# Patient Record
Sex: MALE | Race: OTHER | HISPANIC OR LATINO
[De-identification: names, ages, dates, MRNs, and addresses within clinical notes are randomized per-mention and may not be internally consistent; named-entity substitution may affect disease eponyms.]

---

## 2021-09-09 PROBLEM — Z00.00 ENCOUNTER FOR PREVENTIVE HEALTH EXAMINATION: Status: ACTIVE | Noted: 2021-09-09

## 2021-09-10 ENCOUNTER — APPOINTMENT (OUTPATIENT)
Dept: DISASTER EMERGENCY | Facility: CLINIC | Age: 46
End: 2021-09-10

## 2021-09-11 LAB — SARS-COV-2 N GENE NPH QL NAA+PROBE: NOT DETECTED

## 2021-09-13 ENCOUNTER — OUTPATIENT (OUTPATIENT)
Dept: OUTPATIENT SERVICES | Facility: HOSPITAL | Age: 46
LOS: 1 days | End: 2021-09-13
Payer: COMMERCIAL

## 2021-09-13 ENCOUNTER — RESULT REVIEW (OUTPATIENT)
Age: 46
End: 2021-09-13

## 2021-09-13 DIAGNOSIS — K92.2 GASTROINTESTINAL HEMORRHAGE, UNSPECIFIED: ICD-10-CM

## 2021-09-13 PROCEDURE — 43239 EGD BIOPSY SINGLE/MULTIPLE: CPT

## 2021-09-13 PROCEDURE — 88312 SPECIAL STAINS GROUP 1: CPT | Mod: 26

## 2021-09-13 PROCEDURE — 88305 TISSUE EXAM BY PATHOLOGIST: CPT

## 2021-09-13 PROCEDURE — 88305 TISSUE EXAM BY PATHOLOGIST: CPT | Mod: 26

## 2021-09-13 PROCEDURE — 88312 SPECIAL STAINS GROUP 1: CPT

## 2021-09-15 LAB — SURGICAL PATHOLOGY STUDY: SIGNIFICANT CHANGE UP

## 2021-09-16 DIAGNOSIS — Z01.818 ENCOUNTER FOR OTHER PREPROCEDURAL EXAMINATION: ICD-10-CM

## 2021-09-17 ENCOUNTER — APPOINTMENT (OUTPATIENT)
Dept: DISASTER EMERGENCY | Facility: CLINIC | Age: 46
End: 2021-09-17

## 2021-09-19 LAB — SARS-COV-2 N GENE NPH QL NAA+PROBE: NOT DETECTED

## 2021-09-20 ENCOUNTER — RESULT REVIEW (OUTPATIENT)
Age: 46
End: 2021-09-20

## 2021-09-20 ENCOUNTER — OUTPATIENT (OUTPATIENT)
Dept: OUTPATIENT SERVICES | Facility: HOSPITAL | Age: 46
LOS: 1 days | End: 2021-09-20
Payer: COMMERCIAL

## 2021-09-20 DIAGNOSIS — K62.5 HEMORRHAGE OF ANUS AND RECTUM: ICD-10-CM

## 2021-09-20 PROCEDURE — 88305 TISSUE EXAM BY PATHOLOGIST: CPT

## 2021-09-20 PROCEDURE — 45380 COLONOSCOPY AND BIOPSY: CPT

## 2021-09-20 PROCEDURE — 88305 TISSUE EXAM BY PATHOLOGIST: CPT | Mod: 26

## 2021-09-20 NOTE — PROGRESS NOTE ADULT - SUBJECTIVE AND OBJECTIVE BOX
Colonoscopy Report  Indication: Hematochezia   Referring: Jorge Luis MD  Instrument:  # [id number]  Anesthesia: MAC  Consent:  Informed consent was obtained from the patient after providing any opportunity for questions  Procedure: After placing the patient in the left lateral decubitus position, the colonoscope was gently inserted into the rectum and advanced to the cecum. Color, texture, mucosa, and anatomy of the colon were carefully examined with the scope. The patient tolerated the procedure well. After completion of the exam, the patient was transferred to the recovery room.     Preparation:  Findings:   Anal Canal	        Large internal hemorrhoids  Rectum	                Punctate mucosal erythema (Bxed)  Sigmoid Colon 	        Few diverticula  Descending Colon	Normal  Splenic Flexure	Normal  Transverse Colon	Normal  Hepatic Flexure	Normal  Ascending Colon	 Normal  Cecum	                 Normal  Ileo-cecal Valve	 Normal  Ileum 	                 Not intubated     EBL:0    Impression:     1. Rare diverticulosis  2. R/o proctitis  3. Internal hemorrhoids    Plan:  1. Follow up path  2. High fiber diet  3. Sitz bath      Procedure Start Time:  10:13  Cecum Reached Time:  10:18  Procedure End Time:   10:26  Total Withdrawal Time: 8 minutes      Attending:     Jorge Luis MD

## 2021-09-22 LAB — SURGICAL PATHOLOGY STUDY: SIGNIFICANT CHANGE UP

## 2023-04-27 ENCOUNTER — APPOINTMENT (OUTPATIENT)
Dept: SURGERY | Facility: CLINIC | Age: 48
End: 2023-04-27
Payer: COMMERCIAL

## 2023-04-27 VITALS — HEIGHT: 68 IN | SYSTOLIC BLOOD PRESSURE: 138 MMHG | DIASTOLIC BLOOD PRESSURE: 86 MMHG | HEART RATE: 82 BPM

## 2023-04-27 VITALS — BODY MASS INDEX: 27.83 KG/M2 | WEIGHT: 183 LBS

## 2023-04-27 DIAGNOSIS — Z82.5 FAMILY HISTORY OF ASTHMA AND OTHER CHRONIC LOWER RESPIRATORY DISEASES: ICD-10-CM

## 2023-04-27 DIAGNOSIS — Z82.0 FAMILY HISTORY OF EPILEPSY AND OTHER DISEASES OF THE NERVOUS SYSTEM: ICD-10-CM

## 2023-04-27 DIAGNOSIS — Z87.891 PERSONAL HISTORY OF NICOTINE DEPENDENCE: ICD-10-CM

## 2023-04-27 PROCEDURE — 99203 OFFICE O/P NEW LOW 30 MIN: CPT

## 2023-04-27 NOTE — CONSULT LETTER
[Dear  ___] : Dear  [unfilled], [Consult Letter:] : I had the pleasure of evaluating your patient, [unfilled]. [Please see my note below.] : Please see my note below. [Consult Closing:] : Thank you very much for allowing me to participate in the care of this patient.  If you have any questions, please do not hesitate to contact me. [Sincerely,] : Sincerely, [FreeTextEntry3] : Elías Fuentes MD, FACS

## 2023-04-27 NOTE — PHYSICAL EXAM
[Alert] : alert [Oriented to Person] : oriented to person [Oriented to Place] : oriented to place [Oriented to Time] : oriented to time [Calm] : calm [de-identified] : He  is alert, well-groomed, and in NAD\par   [de-identified] : anicteric.  Nasal mucosa pink, septum midline. Oral mucosa pink.  Tongue midline, Pharynx without exudates.\par   [de-identified] : Neck supple. Trachea midline. Thyroid isthmus barely palpable, lobes not felt.\par   [de-identified] :  incarcerated umbilical hernia, mildly tender.  The defect appears to be relatively small, less then 3 cm and the skin overlying the hernia is normal . \par reducible Left   inguinal hernia.  No evidence of hernia on the opposite side.  No penile discharge or lesions.  No scrotal swelling or discoloration. Testes descended bilaterally, smooth, without masses. Epididymis non-tender.

## 2023-04-27 NOTE — HISTORY OF PRESENT ILLNESS
[de-identified] : Mr. JULIO CESAR MORTENSEN is a 47 year  old male who was referred by Dr. Tom Shah with the chief complaint of having an umbilical and  left inguinal  hernia.  He reports having this condition for many years. He denies any trauma to the area, fever, nausea, vomiting, distension, night sweats and  loss of appetite.  Symptoms aggravated by cough and straining.    He reports normal bowel movements .  He reports   blood per rectum.  He denies  previous abdominal surgeries or  wound infections. \par \par

## 2023-04-27 NOTE — PLAN
[FreeTextEntry1] : Mr. MORTENSEN  was told significance of findings, options, risks and benefits were explained.  Informed consent for incarcerated umbilical and left inguinal hernia repair with mesh and potential risks, benefits and alternatives (surgical options were discussed including non-surgical options or the option of no surgery) to the planned surgery were discussed in depth.  All surgical options were discussed including non-surgical treatments.  \par patient reports melana and acid reflux. he was advised to have GI work up prior to surgery. he will follow up after EGD and colonoscopy to discuss his surgery in details. \par Patient advised to seek immediate medical attention with any acute change in symptoms or with the development of any new or worsening symptoms.  Patient's questions and concerns addressed to patient's satisfaction, and patient verbalized an understanding of the information discussed.\par \par

## 2023-06-19 ENCOUNTER — APPOINTMENT (OUTPATIENT)
Dept: SURGERY | Facility: CLINIC | Age: 48
End: 2023-06-19
Payer: COMMERCIAL

## 2023-06-19 VITALS
HEIGHT: 68 IN | DIASTOLIC BLOOD PRESSURE: 81 MMHG | BODY MASS INDEX: 27.74 KG/M2 | WEIGHT: 183 LBS | SYSTOLIC BLOOD PRESSURE: 126 MMHG | HEART RATE: 106 BPM

## 2023-06-19 PROCEDURE — 99213 OFFICE O/P EST LOW 20 MIN: CPT

## 2023-06-19 NOTE — PHYSICAL EXAM
[Alert] : alert [Oriented to Person] : oriented to person [Oriented to Place] : oriented to place [Oriented to Time] : oriented to time [Calm] : calm [de-identified] : He  is alert, well-groomed, and in NAD\par   [de-identified] : anicteric.  Nasal mucosa pink, septum midline. Oral mucosa pink.  Tongue midline, Pharynx without exudates.\par   [de-identified] : Neck supple. Trachea midline. Thyroid isthmus barely palpable, lobes not felt.\par   [de-identified] :  incarcerated umbilical hernia, mildly tender.  The defect appears to be relatively small, less then 3 cm and the skin overlying the hernia is normal . \par reducible Left   inguinal hernia.  No evidence of hernia on the opposite side.  No penile discharge or lesions.  No scrotal swelling or discoloration. Testes descended bilaterally, smooth, without masses. Epididymis non-tender.

## 2023-06-19 NOTE — HISTORY OF PRESENT ILLNESS
[de-identified] : Mr. JULIO CESAR MORTENSEN is a 47 year  old male who was referred by Dr. Tom Shah with the chief complaint of having an umbilical and  left inguinal  hernia.  He reports having this condition for many years. He denies any trauma to the area, fever, nausea, vomiting, distension, night sweats and  loss of appetite.  Symptoms aggravated by cough and straining.    He reports normal bowel movements . He had black stools and he had an EGD and it was  normal. he wants to have the colonoscopy after the hernia repair  \par \par

## 2023-06-19 NOTE — PLAN
[FreeTextEntry1] : Mr. MORTENSEN  was told significance of findings, options, risks and benefits were explained.  Informed consent for  left inguinal hernia  repair with mesh and  incarcerated umbilical  hernia repair and potential risks, benefits and alternatives (surgical options were discussed including non-surgical options or the option of no surgery) to the planned surgery were discussed in depth.  All surgical options were discussed including non-surgical treatments.  He wishes to proceed with surgery.  We will plan for surgery on at the next available date, pending any required insurance pre-certification or pre-approval. He agrees to obtain any necessary pre-operative evaluations and testing prior to surgery.\par Patient advised to seek immediate medical attention with any acute change in symptoms or with the development of any new or worsening symptoms.  Patient's questions and concerns addressed to patient's satisfaction, and patient verbalized an understanding of the information discussed.\par \par

## 2023-07-14 ENCOUNTER — OUTPATIENT (OUTPATIENT)
Dept: OUTPATIENT SERVICES | Facility: HOSPITAL | Age: 48
LOS: 1 days | End: 2023-07-14
Payer: COMMERCIAL

## 2023-07-14 VITALS
DIASTOLIC BLOOD PRESSURE: 76 MMHG | SYSTOLIC BLOOD PRESSURE: 117 MMHG | WEIGHT: 179.9 LBS | RESPIRATION RATE: 16 BRPM | OXYGEN SATURATION: 98 % | HEART RATE: 86 BPM | HEIGHT: 68 IN | TEMPERATURE: 99 F

## 2023-07-14 DIAGNOSIS — Z01.818 ENCOUNTER FOR OTHER PREPROCEDURAL EXAMINATION: ICD-10-CM

## 2023-07-14 DIAGNOSIS — Z98.890 OTHER SPECIFIED POSTPROCEDURAL STATES: Chronic | ICD-10-CM

## 2023-07-14 DIAGNOSIS — K40.90 UNILATERAL INGUINAL HERNIA, WITHOUT OBSTRUCTION OR GANGRENE, NOT SPECIFIED AS RECURRENT: ICD-10-CM

## 2023-07-14 DIAGNOSIS — K21.9 GASTRO-ESOPHAGEAL REFLUX DISEASE WITHOUT ESOPHAGITIS: ICD-10-CM

## 2023-07-14 DIAGNOSIS — K42.0 UMBILICAL HERNIA WITH OBSTRUCTION, WITHOUT GANGRENE: ICD-10-CM

## 2023-07-14 LAB
ANION GAP SERPL CALC-SCNC: 6 MMOL/L — SIGNIFICANT CHANGE UP (ref 5–17)
BLD GP AB SCN SERPL QL: SIGNIFICANT CHANGE UP
BUN SERPL-MCNC: 16 MG/DL — SIGNIFICANT CHANGE UP (ref 7–18)
CALCIUM SERPL-MCNC: 8.5 MG/DL — SIGNIFICANT CHANGE UP (ref 8.4–10.5)
CHLORIDE SERPL-SCNC: 105 MMOL/L — SIGNIFICANT CHANGE UP (ref 96–108)
CO2 SERPL-SCNC: 28 MMOL/L — SIGNIFICANT CHANGE UP (ref 22–31)
CREAT SERPL-MCNC: 0.9 MG/DL — SIGNIFICANT CHANGE UP (ref 0.5–1.3)
EGFR: 105 ML/MIN/1.73M2 — SIGNIFICANT CHANGE UP
GLUCOSE SERPL-MCNC: 94 MG/DL — SIGNIFICANT CHANGE UP (ref 70–99)
HCT VFR BLD CALC: 34.3 % — LOW (ref 39–50)
HGB BLD-MCNC: 9.6 G/DL — LOW (ref 13–17)
MCHC RBC-ENTMCNC: 20.9 PG — LOW (ref 27–34)
MCHC RBC-ENTMCNC: 28 GM/DL — LOW (ref 32–36)
MCV RBC AUTO: 74.7 FL — LOW (ref 80–100)
NRBC # BLD: 0 /100 WBCS — SIGNIFICANT CHANGE UP (ref 0–0)
PLATELET # BLD AUTO: 304 K/UL — SIGNIFICANT CHANGE UP (ref 150–400)
POTASSIUM SERPL-MCNC: 4.2 MMOL/L — SIGNIFICANT CHANGE UP (ref 3.5–5.3)
POTASSIUM SERPL-SCNC: 4.2 MMOL/L — SIGNIFICANT CHANGE UP (ref 3.5–5.3)
RBC # BLD: 4.59 M/UL — SIGNIFICANT CHANGE UP (ref 4.2–5.8)
RBC # FLD: 17.2 % — HIGH (ref 10.3–14.5)
SODIUM SERPL-SCNC: 139 MMOL/L — SIGNIFICANT CHANGE UP (ref 135–145)
WBC # BLD: 6.75 K/UL — SIGNIFICANT CHANGE UP (ref 3.8–10.5)
WBC # FLD AUTO: 6.75 K/UL — SIGNIFICANT CHANGE UP (ref 3.8–10.5)

## 2023-07-14 PROCEDURE — 93005 ELECTROCARDIOGRAM TRACING: CPT

## 2023-07-14 PROCEDURE — G0463: CPT

## 2023-07-14 RX ORDER — SODIUM CHLORIDE 9 MG/ML
3 INJECTION INTRAMUSCULAR; INTRAVENOUS; SUBCUTANEOUS EVERY 8 HOURS
Refills: 0 | Status: DISCONTINUED | OUTPATIENT
Start: 2023-07-21 | End: 2023-08-04

## 2023-07-14 NOTE — H&P PST ADULT - NEGATIVE PSYCHIATRIC SYMPTOMS
Dick    
no suicidal ideation/no depression/no anxiety/no insomnia/no memory loss

## 2023-07-14 NOTE — H&P PST ADULT - PROBLEM SELECTOR PLAN 1
Patient is scheduled for left inguinal hernia repair with mesh and incarcerated umbilical hernia repair on 7/21/2023.Written and oral preoperative instructions given to patient with understanding verbalized. Instructions given  to include using 4% chlorhexidine wash and directed starting 3 days before day of surgery ( inclusive day of surgery). Maintaining NPO status post midnight before surgery. Stopping aspirin, NSAIDs, herbs, vitamins 7 days before surgery. Patient is to expect a phone call day before surgery between the hours 4 : 30 pm and 6: 30 pm giving arrival time for surgery day.  Patient today with STOP bang score of 2 , low risk for ALCON , labs draw, EKG done awaiting for results

## 2023-07-14 NOTE — H&P PST ADULT - PROBLEM SELECTOR PLAN 2
Patient is having low risk surgery, does not have his own PCP , no complains, in case of abnormal lab tests needs to have medical clearance to be arranged at [facility] or labs need to be reviewed by Anesthesia and depends on scenario to be evaluated if patient needs medical clearance

## 2023-07-14 NOTE — H&P PST ADULT - ASSESSMENT
Patient is scheduled for left inguinal hernia repair with mesh and incarcerated umbilical hernia repair on 7/21/2023.

## 2023-07-14 NOTE — H&P PST ADULT - NSANTHOSAYNRD_GEN_A_CORE
No. ALCON screening performed.  STOP BANG Legend: 0-2 = LOW Risk; 3-4 = INTERMEDIATE Risk; 5-8 = HIGH Risk

## 2023-07-14 NOTE — H&P PST ADULT - GASTROINTESTINAL COMMENTS
acid reflux , hemorrhoids umbilical area slight tenderness and distension due to umbilical hernia not being  able to be retracted

## 2023-07-14 NOTE — H&P PST ADULT - PROBLEM SELECTOR PLAN 3
Take Nexium in the morning of surgery with few sips of water , follow up after surgery with Surgeon and is advised ti find Primary doctor to have yearly checkups done regularly.

## 2023-07-14 NOTE — H&P PST ADULT - CONTRAINDICATED REASON
occasional Nicotine patch  Informed pt of the various negative side effects of smoking including risk of COPD, Lung Ca etc  Strongly recommended that pt stops smoking and pt given various options of smoking cessation tools such as NRT's and other pharmacotherapies no desire to stop

## 2023-07-14 NOTE — H&P PST ADULT - LAST STRESS TEST
Spoke with pt and   Pt is feeling fine today    Most concerned about the fact that "bile" came up yesterday  Discussed zofran use when just nauseated - may stop the vomiting  Pt to carry around hydration and sip all day  Pt does not want iv hydration  Will cb if vomiting recurs never

## 2023-07-14 NOTE — H&P PST ADULT - HISTORY OF PRESENT ILLNESS
48 years old male pmh of acid reflux and hemorrhoids c/o of swelling and tenderness in area of umbilicus x many years , pt was also found having swelling in left groin. Patient was diagnosed with umbilical hernia with obstruction and without gangrene and unilateral inguinal hernia without obstruction and without gangrene , non recurrent and is scheduled for left inguinal hernia repair with mesh and incarcerated umbilical hernia repair on 7/21/2023.

## 2023-07-14 NOTE — H&P PST ADULT - GENITOURINARY MALE
left inguinal hernia, no palpable , hx of hemorrhoids , no bleeding from hemorrhoids at this moment/hernia L/hypospadias

## 2023-07-20 ENCOUNTER — TRANSCRIPTION ENCOUNTER (OUTPATIENT)
Age: 48
End: 2023-07-20

## 2023-07-21 ENCOUNTER — APPOINTMENT (OUTPATIENT)
Dept: SURGERY | Facility: HOSPITAL | Age: 48
End: 2023-07-21
Payer: COMMERCIAL

## 2023-07-21 ENCOUNTER — TRANSCRIPTION ENCOUNTER (OUTPATIENT)
Age: 48
End: 2023-07-21

## 2023-07-21 ENCOUNTER — OUTPATIENT (OUTPATIENT)
Dept: OUTPATIENT SERVICES | Facility: HOSPITAL | Age: 48
LOS: 1 days | End: 2023-07-21
Payer: COMMERCIAL

## 2023-07-21 VITALS
TEMPERATURE: 98 F | WEIGHT: 179.9 LBS | HEART RATE: 82 BPM | OXYGEN SATURATION: 97 % | RESPIRATION RATE: 16 BRPM | DIASTOLIC BLOOD PRESSURE: 73 MMHG | SYSTOLIC BLOOD PRESSURE: 109 MMHG | HEIGHT: 68 IN

## 2023-07-21 VITALS
TEMPERATURE: 98 F | HEART RATE: 92 BPM | RESPIRATION RATE: 15 BRPM | DIASTOLIC BLOOD PRESSURE: 80 MMHG | OXYGEN SATURATION: 96 % | SYSTOLIC BLOOD PRESSURE: 125 MMHG

## 2023-07-21 DIAGNOSIS — K40.90 UNILATERAL INGUINAL HERNIA, WITHOUT OBSTRUCTION OR GANGRENE, NOT SPECIFIED AS RECURRENT: ICD-10-CM

## 2023-07-21 DIAGNOSIS — Z98.890 OTHER SPECIFIED POSTPROCEDURAL STATES: Chronic | ICD-10-CM

## 2023-07-21 DIAGNOSIS — K42.0 UMBILICAL HERNIA WITH OBSTRUCTION, WITHOUT GANGRENE: ICD-10-CM

## 2023-07-21 LAB — BLD GP AB SCN SERPL QL: SIGNIFICANT CHANGE UP

## 2023-07-21 PROCEDURE — 49505 PRP I/HERN INIT REDUC >5 YR: CPT | Mod: LT

## 2023-07-21 PROCEDURE — 49505 PRP I/HERN INIT REDUC >5 YR: CPT

## 2023-07-21 PROCEDURE — 86900 BLOOD TYPING SEROLOGIC ABO: CPT

## 2023-07-21 PROCEDURE — C1781: CPT

## 2023-07-21 PROCEDURE — 36415 COLL VENOUS BLD VENIPUNCTURE: CPT

## 2023-07-21 PROCEDURE — 86901 BLOOD TYPING SEROLOGIC RH(D): CPT

## 2023-07-21 PROCEDURE — 49592 RPR AA HRN 1ST < 3 NCR/STRN: CPT | Mod: AS

## 2023-07-21 PROCEDURE — 86850 RBC ANTIBODY SCREEN: CPT

## 2023-07-21 PROCEDURE — 49505 PRP I/HERN INIT REDUC >5 YR: CPT | Mod: AS,LT

## 2023-07-21 PROCEDURE — 49592 RPR AA HRN 1ST < 3 NCR/STRN: CPT

## 2023-07-21 DEVICE — MESH HERNIA MARLEX 3 X 6": Type: IMPLANTABLE DEVICE | Status: FUNCTIONAL

## 2023-07-21 RX ORDER — OXYCODONE AND ACETAMINOPHEN 5; 325 MG/1; MG/1
1 TABLET ORAL
Qty: 8 | Refills: 0
Start: 2023-07-21 | End: 2023-07-22

## 2023-07-21 RX ORDER — SODIUM CHLORIDE 9 MG/ML
1000 INJECTION, SOLUTION INTRAVENOUS
Refills: 0 | Status: DISCONTINUED | OUTPATIENT
Start: 2023-07-21 | End: 2023-07-21

## 2023-07-21 RX ORDER — ESOMEPRAZOLE MAGNESIUM 40 MG/1
1 CAPSULE, DELAYED RELEASE ORAL
Refills: 0 | DISCHARGE

## 2023-07-21 RX ORDER — HYDROMORPHONE HYDROCHLORIDE 2 MG/ML
0.5 INJECTION INTRAMUSCULAR; INTRAVENOUS; SUBCUTANEOUS
Refills: 0 | Status: DISCONTINUED | OUTPATIENT
Start: 2023-07-21 | End: 2023-07-21

## 2023-07-21 RX ORDER — ONDANSETRON 8 MG/1
4 TABLET, FILM COATED ORAL ONCE
Refills: 0 | Status: DISCONTINUED | OUTPATIENT
Start: 2023-07-21 | End: 2023-07-21

## 2023-07-21 RX ORDER — FENTANYL CITRATE 50 UG/ML
25 INJECTION INTRAVENOUS
Refills: 0 | Status: DISCONTINUED | OUTPATIENT
Start: 2023-07-21 | End: 2023-07-21

## 2023-07-21 NOTE — BRIEF OPERATIVE NOTE - NSICDXBRIEFPROCEDURE_GEN_ALL_CORE_FT
PROCEDURES:  Repair, hernia, inguinal, open, using mesh, adult 21-Jul-2023 16:56:27 left Jackelin Edwards

## 2023-07-21 NOTE — ASU DISCHARGE PLAN (ADULT/PEDIATRIC) - CARE PROVIDER_API CALL
Elías Fuentes  Surgery  [address]  [place] [address]  Phone: [phone]  Fax: [phone]  Follow Up Time:

## 2023-07-21 NOTE — ASU DISCHARGE PLAN (ADULT/PEDIATRIC) - NS MD DC FALL RISK RISK
For information on Fall & Injury Prevention, visit: [email or web address] OR  [email or web address] OR  https://www.cdc.gov/steadi/patient.html

## 2023-07-21 NOTE — BRIEF OPERATIVE NOTE - NSICDXBRIEFPREOP_GEN_ALL_CORE_FT
PRE-OP DIAGNOSIS:  Umbilical hernia without obstruction or gangrene 21-Jul-2023 16:57:00  Jackelin Edwards  Inguinal hernia without obstruction or gangrene 21-Jul-2023 16:56:42 left Jackelin Edwards

## 2023-07-21 NOTE — BRIEF OPERATIVE NOTE - NSICDXBRIEFPOSTOP_GEN_ALL_CORE_FT
POST-OP DIAGNOSIS:  Umbilical hernia without obstruction or gangrene 21-Jul-2023 16:57:10  Jackelin Edwards  Inguinal hernia without obstruction or gangrene 21-Jul-2023 16:57:15 left Jackelin Edwards

## 2023-07-21 NOTE — ASU PATIENT PROFILE, ADULT - FALL HARM RISK - UNIVERSAL INTERVENTIONS
Bed in lowest position, wheels locked, appropriate side rails in place/Call bell, personal items and telephone in reach/Non-slip footwear when patient is out of bed/[place] to call system/Physically safe environment - no spills, clutter or unnecessary equipment/Purposeful Proactive Rounding/Room/bathroom lighting operational, light cord in reach

## 2023-08-01 PROBLEM — K21.9 GASTRO-ESOPHAGEAL REFLUX DISEASE WITHOUT ESOPHAGITIS: Chronic | Status: ACTIVE | Noted: 2023-07-14

## 2023-08-01 PROBLEM — K64.9 UNSPECIFIED HEMORRHOIDS: Chronic | Status: ACTIVE | Noted: 2023-07-14

## 2023-08-03 ENCOUNTER — APPOINTMENT (OUTPATIENT)
Age: 48
End: 2023-08-03
Payer: COMMERCIAL

## 2023-08-03 PROCEDURE — 99024 POSTOP FOLLOW-UP VISIT: CPT

## 2023-08-03 NOTE — ASSESSMENT
[FreeTextEntry1] : Mr. MORTENSEN is doing well, with excellent post-operative recovery. All surgical incisions are healing well and as expected. There is no evidence of infection or complication, and he is progressing as expected. Post-operative wound care, activity, restrictions and precautions reinforced. Patient instructed to refrain from any heavy lifting greater than 10-15 pounds for at least 4-6 weeks post-operatively. Patient's questions and concerns addressed to patient's satisfaction.

## 2023-08-03 NOTE — HISTORY OF PRESENT ILLNESS
[de-identified] : Mr. MORTENSEN  is s/p indirect left inguinal hernia and  incarcerated umbilical hernia repair on 07/21/2023. Today Mr. MORTENSEN offers no complaints. patient reports no fever, chills,  or  pain. His  surgical wounds are  healing well. No signs of inflammation, infection or exudate. Patient reports good bowel movements and appetite.

## 2023-08-03 NOTE — PHYSICAL EXAM
[Alert] : alert [Oriented to Person] : oriented to person [Oriented to Place] : oriented to place [Oriented to Time] : oriented to time [Calm] : calm [de-identified] : He  is alert, well-groomed, and in NAD\par    [de-identified] : anicteric.  Nasal mucosa pink, septum midline. Oral mucosa pink.  Tongue midline, Pharynx without exudates.\par    [de-identified] : Neck supple. Trachea midline. Thyroid isthmus barely palpable, lobes not felt.\par    [de-identified] : left groin and umbilicus Surgical wound is healing well.   no signs of  inflammation or infection. , no recurrence   No penile discharge or lesions.  No scrotal swelling or discoloration. Testes descended bilaterally, smooth, without masses. Epididymis non-tender.

## 2023-08-03 NOTE — PLAN
[FreeTextEntry1] : patient will follow up in 3 months or if needed. Warning signs, follow up, and restrictions were discussed with the patient.  no heavy lifting  4-6 weeks.

## 2023-08-25 NOTE — H&P PST ADULT - CARDIOVASCULAR
INTEREST LETTER SENT TO Douglas Cruz VIA MY CHART ON 8/25/2023 details… normal/regular rate and rhythm/S1 S2 present/no gallops/no rub/no murmur

## 2023-10-17 ENCOUNTER — NON-APPOINTMENT (OUTPATIENT)
Age: 48
End: 2023-10-17

## 2023-11-02 ENCOUNTER — APPOINTMENT (OUTPATIENT)
Dept: SURGERY | Facility: CLINIC | Age: 48
End: 2023-11-02
Payer: SELF-PAY

## 2023-11-02 VITALS
DIASTOLIC BLOOD PRESSURE: 77 MMHG | OXYGEN SATURATION: 97 % | BODY MASS INDEX: 28.41 KG/M2 | WEIGHT: 181 LBS | HEART RATE: 103 BPM | HEIGHT: 66.9 IN | SYSTOLIC BLOOD PRESSURE: 142 MMHG

## 2023-11-02 DIAGNOSIS — K40.90 UNILATERAL INGUINAL HERNIA, W/OUT OBSTRUCTION OR GANGRENE, NOT SPECIFIED AS RECURRENT: ICD-10-CM

## 2023-11-02 DIAGNOSIS — K42.0 UMBILICAL HERNIA WITH OBSTRUCTION, W/OUT GANGRENE: ICD-10-CM

## 2023-11-02 PROCEDURE — 99213 OFFICE O/P EST LOW 20 MIN: CPT

## 2025-08-05 ENCOUNTER — NON-APPOINTMENT (OUTPATIENT)
Age: 50
End: 2025-08-05

## 2025-08-07 ENCOUNTER — APPOINTMENT (OUTPATIENT)
Dept: SURGERY | Facility: CLINIC | Age: 50
End: 2025-08-07
Payer: COMMERCIAL

## 2025-08-07 VITALS
HEART RATE: 84 BPM | OXYGEN SATURATION: 96 % | HEIGHT: 66.9 IN | BODY MASS INDEX: 26.68 KG/M2 | WEIGHT: 170 LBS | SYSTOLIC BLOOD PRESSURE: 114 MMHG | DIASTOLIC BLOOD PRESSURE: 72 MMHG

## 2025-08-07 DIAGNOSIS — K44.9 DIAPHRAGMATIC HERNIA W/OUT OBSTRUCTION OR GANGRENE: ICD-10-CM

## 2025-08-07 PROCEDURE — 99214 OFFICE O/P EST MOD 30 MIN: CPT

## (undated) DEVICE — DRAPE LAPAROTOMY W POUCHES

## (undated) DEVICE — SUT POLYSORB 0 36" GS-25

## (undated) DEVICE — PREP CHLORAPREP HI-LITE ORANGE 26ML

## (undated) DEVICE — SUT POLYSORB 3-0 30" V-20 UNDYED

## (undated) DEVICE — SUT POLYSORB 2-0 18" TIES UNDYED

## (undated) DEVICE — SUT SURGIPRO 2-0 30" GS-22

## (undated) DEVICE — PACK MINOR NO DRAPE

## (undated) DEVICE — DRAIN PENROSE 5/8" X 18" LATEX

## (undated) DEVICE — FOR-ESU VALLEYLAB T7E14848DX: Type: DURABLE MEDICAL EQUIPMENT

## (undated) DEVICE — DRSG CURITY GAUZE SPONGE 4 X 4" 12-PLY

## (undated) DEVICE — SUT SURGIPRO II 1 30" GS-25

## (undated) DEVICE — WARMING BLANKET UPPER ADULT

## (undated) DEVICE — DRSG TEGADERM 4X4.75"

## (undated) DEVICE — DRSG MASTISOL

## (undated) DEVICE — GLV 7.5 PROTEXIS (WHITE)

## (undated) DEVICE — DRAPE 1/2 SHEET 40X57"

## (undated) DEVICE — DRAPE LIGHT HANDLE COVER (BLUE)

## (undated) DEVICE — SPONGE DISSECTOR PEANUT

## (undated) DEVICE — ELCTR GROUNDING PAD ADULT COVIDIEN

## (undated) DEVICE — VENODYNE/SCD SLEEVE CALF MEDIUM

## (undated) DEVICE — SOL IRR POUR NS 0.9% 1500ML

## (undated) DEVICE — NDL HYPO SAFE 25G X 1.5" (ORANGE)

## (undated) DEVICE — SUT POLYSORB 2-0 30" V-20 UNDYED

## (undated) DEVICE — GLV 7 PROTEXIS (WHITE)

## (undated) DEVICE — SUT POLYSORB 3-0 36" GS-21

## (undated) DEVICE — SUT POLYSORB 4-0 27" P-12 UNDYED